# Patient Record
Sex: FEMALE | Race: WHITE
[De-identification: names, ages, dates, MRNs, and addresses within clinical notes are randomized per-mention and may not be internally consistent; named-entity substitution may affect disease eponyms.]

---

## 2017-10-26 ENCOUNTER — HOSPITAL ENCOUNTER (OUTPATIENT)
Dept: HOSPITAL 46 - OPS | Age: 62
Discharge: HOME | End: 2017-10-26
Attending: ORTHOPAEDIC SURGERY
Payer: COMMERCIAL

## 2017-10-26 VITALS — WEIGHT: 159.99 LBS | BODY MASS INDEX: 28.35 KG/M2 | HEIGHT: 63 IN

## 2017-10-26 DIAGNOSIS — M75.01: Primary | ICD-10-CM

## 2017-10-26 DIAGNOSIS — I10: ICD-10-CM

## 2017-10-26 DIAGNOSIS — Z88.2: ICD-10-CM

## 2017-10-26 DIAGNOSIS — Z88.8: ICD-10-CM

## 2017-10-26 PROCEDURE — 0RSJ0ZZ REPOSITION RIGHT SHOULDER JOINT, OPEN APPROACH: ICD-10-PCS | Performed by: ORTHOPAEDIC SURGERY

## 2017-10-26 NOTE — NUR
10/26/17 0719 Nga Farr
0716 - PT ARRIVED TO PACU. AIRWAY HELD. CRNA AWARE OF BP, STATES HIGH
VALUES ARE RELATED TO PT NOT HAVING BP MEDICATION THIS AM. "NO ACTION
NEEDED AT THIS TIME."

## 2023-02-24 ENCOUNTER — HOSPITAL ENCOUNTER (OUTPATIENT)
Dept: HOSPITAL 46 - DS | Age: 68
Discharge: HOME | End: 2023-02-24
Attending: SURGERY
Payer: MEDICARE

## 2023-02-24 VITALS — HEIGHT: 63 IN | WEIGHT: 162.04 LBS | BODY MASS INDEX: 28.71 KG/M2

## 2023-02-24 DIAGNOSIS — K59.09: ICD-10-CM

## 2023-02-24 DIAGNOSIS — K57.30: ICD-10-CM

## 2023-02-24 DIAGNOSIS — K63.5: ICD-10-CM

## 2023-02-24 DIAGNOSIS — D12.2: ICD-10-CM

## 2023-02-24 DIAGNOSIS — K20.90: ICD-10-CM

## 2023-02-24 DIAGNOSIS — K21.00: ICD-10-CM

## 2023-02-24 DIAGNOSIS — Z12.11: Primary | ICD-10-CM

## 2023-02-24 PROCEDURE — G0500 MOD SEDAT ENDO SERVICE >5YRS: HCPCS

## 2023-02-24 PROCEDURE — 0DBK8ZZ EXCISION OF ASCENDING COLON, VIA NATURAL OR ARTIFICIAL OPENING ENDOSCOPIC: ICD-10-PCS | Performed by: SURGERY

## 2023-02-24 PROCEDURE — 0DB68ZX EXCISION OF STOMACH, VIA NATURAL OR ARTIFICIAL OPENING ENDOSCOPIC, DIAGNOSTIC: ICD-10-PCS | Performed by: SURGERY

## 2023-02-24 PROCEDURE — 0DBN8ZZ EXCISION OF SIGMOID COLON, VIA NATURAL OR ARTIFICIAL OPENING ENDOSCOPIC: ICD-10-PCS | Performed by: SURGERY

## 2023-02-24 NOTE — NUR
02/24/23 0825 Aleida Osborne
0873 PATIENT ARRIVES TO PACU AWAKE BUT DROWSY. DENIES PAIN OR NAUSEA.
RESP EVEN AND UNLABORED, NC AT 3 LITERS, TURNED OFF. SLEEPING WHEN NOT
STIMULATED.

## 2023-02-27 NOTE — OR
[facility]
                                    [address], [place]  [address]
_________________________________________________________________________________________
                                                                 Signed   
 
 
DATE OF OPERATION:
02/24/2023
 
SURGEON:
Rosa Emanuel MD
 
PREOPERATIVE DIAGNOSES:
1. Gastroesophageal reflux, symptoms generally well controlled.
2. History of diverticulosis.  Last colonoscopy 2012.
 
POSTOPERATIVE DIAGNOSES:
1. Mild distal esophagitis without sign of hiatal hernia.
2. Sigmoid diverticulosis.
3. Polyps x2 (proximal ascending colon and sigmoid).
 
PROCEDURES:
1. Esophagogastroduodenoscopy with biopsy.
2. Total colonoscopy to the cecum with cold morcellation, excision of polyp of proximal
ascending colon and sigmoid. 
 
ANESTHESIA:
Intravenous sedation, fentanyl 150 mcg and Versed 9 mg (total).
 
INDICATION:
This 68-year-old white woman is a patient Dr. Enrique and referred for colonoscopy for
screening as well as upper endoscopy for symptoms of reflux.  She has been treated with
PPI medication persistently and doing well with it generally speaking.  Previous upper
endoscopy confirmed small hiatal hernia and distal esophagitis.  She additionally is
noted on colonoscopy in the past to have diverticulosis.  She is admitted to undergo
upper endoscopy and colonoscopy.  She understands the risk of bleeding, infection,
perforation, and other unforeseen complications. 
 
FINDINGS:
Upper endoscopy was relatively normal.  There is mild distal esophagitis, but no
evidence of Peralta epithelium.  The flap valve appeared good.  I did not see hiatal
hernia, though it was documented from the past. 
 
On colonoscopy, the prep was good and complete colonoscopy was undertaken to the cecum.
There were numerous diverticula of the sigmoid colon.  There were two polyps, one in the
proximal ascending colon, the other in the sigmoid, both were excised. 
 
DESCRIPTION OF PROCEDURE:
 
    Electronically Signed By: ROSA EMANUEL MD  02/27/23 0808
_________________________________________________________________________________________
PATIENT NAME:     ZHENG STANTON                   
MEDICAL RECORD #: [id number]            OPERATIVE REPORT              
          ACCT #: [id number]  
DATE OF BIRTH:   [birth year]            REPORT #: [id number]      
PHYSICIAN:        ROSA EMANUEL MD                 
PCP:              KAI ENRIQUE MD               
REPORT IS CONFIDENTIAL AND NOT TO BE RELEASED WITHOUT AUTHORIZATION
 
 
                                  [facility]
                                    [address], [place]  [address]
_________________________________________________________________________________________
                                                                 Signed   
 
 
The patient was brought to the endoscopy suite and given topical lidocaine,
hypopharyngeal anesthesia and placed in the lateral decubitus position.  She was given
intravenous sedation to the point of slurred speech and nystagmus with full
cardiopulmonary monitoring.  A bite block was placed.  An Olympus video upper endoscope
was passed in the hypopharynx.  The vocal cords were visualized as normal. Scope was
advanced to the esophagus, throughout its length it was normal though the distal portion
had mild area of hyperkeratosis; there was no evidence of Peralta epithelium or a true
stricture.  The scope was passed to the stomach, which was insufflated with air.  Rugal
folds appeared normal as did the antrum.  The pylorus was normal. Scope was passed
through into the duodenum, which was normal. Biopsies taken there to assess for celiac
disease.  The scope was withdrawn and biopsies then taken of the antrum and proximal
stomach for both KATELYN and pathologic testing.  Retroflexed view showed a surprisingly
good flap valve considering documentation in the past of small hiatal hernia.  The scope
was withdrawn to the distal esophagus and the hyperkeratotic area appeared benign, but
was biopsied nevertheless.  Scope was withdrawn and midesophageal biopsies obtained as
well.  The scope was then removed. 
 
Plans were made for colonoscopy.  Digital rectal examination was normal.  Olympus video
colonoscope was passed in the rectum and manipulated throughout the colon ultimately
intubating the cecum itself.  The ileocecal valve and appendiceal orifice were well
identified and normal.  The scope was withdrawn and in the proximal ascending colon
there was a small sessile polyp, almost certainly adenomatous.  It was excised with cold
morcellation technique.  Complete excision was noted.  The scope was withdrawn and
examination found to be normal until the left colon where diverticula were once again
noted.  In the sigmoid, there was a small polyp, possibly hyperplastic, but more likely
adenomatous.  It was excised with cold morcellation technique as well.  Further
withdrawal allowed for retroflexed view of the rectum, which was normal.  Scope was
removed and the patient was taken to the recovery room in good condition. 
 
CONCLUDING DIAGNOSES:
1. Clinical gastroesophageal reflux with mild hyperkeratosis of the distal esophagus
(biopsied). 
2. Diverticulosis of colon with two small polyps.
 
PLAN:
Recommend repeat colonoscopy in 5 years based on polyps.  Would recommend continued use
of Prilosec on a daily basis.  Safety of this approach is well documented ([place]
__________ 2021). 
 
 
 
            ________________________________________
 
    Electronically Signed By: ROSA EMANUEL MD  02/27/23 0808
_________________________________________________________________________________________
PATIENT NAME:     ZHENG STANTON                   
MEDICAL RECORD #: [id number]            OPERATIVE REPORT              
          ACCT #: [id number]  
DATE OF BIRTH:   [birth year]            REPORT #: [id number]      
PHYSICIAN:        ROSA EMANUEL MD                 
PCP:              KAI ENRIQUE MD               
REPORT IS CONFIDENTIAL AND NOT TO BE RELEASED WITHOUT AUTHORIZATION
 
 
                                  [address]  [address]
_________________________________________________________________________________________
                                                                 Signed   
 
 
            Rosa Emanuel MD 
 
 
JM/MODL
Job #:  [id number]
DD:  02/24/2023 08:26:41
DT:  02/24/2023 12:05:54
 
cc:            Kai Enrique MD
 
 
Copies:  KAI ENRIQUE MD
~
 
 
 
 
 
 
 
 
 
 
 
 
 
 
 
 
 
 
 
 
 
 
 
 
 
 
 
 
 
 
    Electronically Signed By: ROSA EMANUEL MD  02/27/23 0808
_________________________________________________________________________________________
PATIENT NAME:     ZHENG STANTON                   
MEDICAL RECORD #: [id number]            OPERATIVE REPORT              
          ACCT #: [id number]  
DATE OF BIRTH:   [birth year]            REPORT #: [id number]      
PHYSICIAN:        ROSA EMANUEL MD                 
PCP:              KAI ENRIQUE MD               
REPORT IS CONFIDENTIAL AND NOT TO BE RELEASED WITHOUT AUTHORIZATION

## 2023-09-07 VITALS — SYSTOLIC BLOOD PRESSURE: 125 MMHG | DIASTOLIC BLOOD PRESSURE: 74 MMHG

## 2023-09-12 ENCOUNTER — HOSPITAL ENCOUNTER (OUTPATIENT)
Dept: HOSPITAL 46 - DS | Age: 68
Discharge: HOME | End: 2023-09-12
Attending: OTOLARYNGOLOGY
Payer: MEDICARE

## 2023-09-12 VITALS — SYSTOLIC BLOOD PRESSURE: 124 MMHG | DIASTOLIC BLOOD PRESSURE: 48 MMHG

## 2023-09-12 VITALS — SYSTOLIC BLOOD PRESSURE: 116 MMHG | DIASTOLIC BLOOD PRESSURE: 46 MMHG

## 2023-09-12 VITALS — DIASTOLIC BLOOD PRESSURE: 46 MMHG | SYSTOLIC BLOOD PRESSURE: 91 MMHG

## 2023-09-12 VITALS — HEIGHT: 63 IN | WEIGHT: 165.35 LBS | BODY MASS INDEX: 29.3 KG/M2

## 2023-09-12 DIAGNOSIS — E78.00: ICD-10-CM

## 2023-09-12 DIAGNOSIS — J32.0: Primary | ICD-10-CM

## 2023-09-12 PROCEDURE — A9270 NON-COVERED ITEM OR SERVICE: HCPCS

## 2023-09-12 PROCEDURE — 09JY0ZZ INSPECTION OF SINUS, OPEN APPROACH: ICD-10-PCS | Performed by: OTOLARYNGOLOGY

## 2023-09-12 NOTE — NUR
PT RESTING IN BED WITH EYES CLOSED ON ENTRANCE, WAKES WITH VERBAL STIMULATION.
PT TOLERATES PO WITH NO NAUSEA. DENIES URGE TO VOID AT THIS TIME. CALL LIGHT
WITHIN REACH.

## 2023-09-12 NOTE — NUR
09/12/23 Gumaro5 Gerri Rogers
1015- PT ARRIVES TO UNIT VIA STRETCHER FROM OR. PT RESPONSIVE TO
TACTILE STIMULI AT THIS TIME. PT REPORTS NO PAIN OR NAUSEA AT THIS
TIME. PT ON RA W/O2 >90% VIA PULSE OX AT THIS TIME. PT RESPIRATIONS
ARE EVEN AND UNLABORED, NO SIGNS OF DISTRESS. JUSTIN TRISHA AT BEDSIDE
FOR REPORT.
 
1019- DR. DEVLIN AT BEDSIDE AT THIS TIME DISCUSSING PROCEDURE WITH
PT.

## 2023-09-12 NOTE — OR
[facility]
                                    [address], [place]  [address]
_________________________________________________________________________________________
                                                                 Signed   
 
 
DATE OF OPERATION:
09/12/2023
 
SURGEON:
Alexander Devlin MD
 
PREOPERATIVE DIAGNOSIS:
Chronic left maxillary sinusitis.
 
POSTOPERATIVE DIAGNOSIS:
Chronic left maxillary sinusitis.
 
PROCEDURE:
Left maxillary sinusotomy.
 
ANESTHESIA:
General LMA, CRNA, Tom.
 
PREOPERATIVE HISTORY:
Ms. Green is a 68-year-old lady with a long history of sinus problems.  She has had
multiple operations in the past.  Most recently, left facial pain, unresponsive to
appropriate medications including multiple antibiotics.  The CAT scan has shown
opacification of the left maxillary sinus with closure of the left nasal antral window.
All other sinuses were clear on the CT scan.  She was taken to the operating room for
the above-mentioned procedures. 
 
OPERATIVE PROCEDURE AND FINDINGS:
After informed consent, the patient was taken to the operating room, placed in the
supine position where general LMA anesthesia was induced. The patient and procedure were
verified.  The patient received preoperative intranasal oxymetazoline and intravenous
Ancef.  Preop CT was reviewed throughout. The nasal cavity was inspected with the
headlight and speculum.  The left nasal antral window was made with a curved ring
curette, widened __________ and purulent material obtained.  The nasal antral window was
widened with the Michelle.  All material removed from the maxillary sinus with curette
and suctioning.  Minimal bleeding stopped afterwards. Pharynx was suctioned clear of
blood and secretions.  No packing was placed.  The patient was awakened, extubated,
transported to recovery in good condition.  No complications. 
 
BLOOD LOSS:
Minimal.
 
SPECIMEN:
 
    Electronically Signed By: ALEXANDER DEVLIN MD  09/12/23 1419
_________________________________________________________________________________________
PATIENT NAME:     ZHENG GREEN                   
MEDICAL RECORD #: [id number]            OPERATIVE REPORT              
          ACCT #: [id number]  
DATE OF BIRTH:   [birth year]            REPORT #: [id number]      
PHYSICIAN:        ALEXANDER DEVLIN MD              
PCP:              JOHN PEREZ MD                 
REPORT IS CONFIDENTIAL AND NOT TO BE RELEASED WITHOUT AUTHORIZATION
 
 
                                  [address]
                                  [place], [place]  [address]
_________________________________________________________________________________________
                                                                 Signed   
 
 
Left maxillary sinus contents to pathology.
 
DRAINS:
No drains.
 
PACKING:
No packing.
 
 
 
            ________________________________________
            Alexander Devlin MD GC/RADHA
Job #:  [id number]
DD:  09/12/2023 10:18:38
DT:  09/12/2023 12:00:54
 
 
Copies:                                
~
 
 
 
 
 
 
 
 
 
 
 
 
 
 
 
 
 
 
 
 
 
    Electronically Signed By: ALEXANDER DEVLIN MD  09/12/23 1419
_________________________________________________________________________________________
PATIENT NAME:     ZHENG GREEN                   
MEDICAL RECORD #: [id number]            OPERATIVE REPORT              
          ACCT #: [id number]  
DATE OF BIRTH:   [birth year]            REPORT #: [id number]      
PHYSICIAN:        ALEXANDER DEVLIN MD              
PCP:              JOHN PEREZ MD                 
REPORT IS CONFIDENTIAL AND NOT TO BE RELEASED WITHOUT AUTHORIZATION

## 2023-09-12 NOTE — NUR
[id number]: PT SITS AT SIDE OF BED PRIOR TO STANDING, DENIES DIZZINESS OR NAUSEA
WITH POSITION CHANGE. PT DRESSES SELF AND ENCOURAGED TO OPEN CURTAIN WHEN
DONE. THIS RN PHONES PT SPOUSE FOR SAFE RIDE HOME. DC INSTRUCTIONS PRESENTED
VERBALLY AND WRITTEN AND PT DS FROM DS RM 3 VIA WC TO SPOUSE IN PERSONAL
VEHICLE TO HOME.

## 2023-09-12 NOTE — NUR
1045: PT BACK TO DS RM 3 FROM PACU VIA STRETCHER, A&O X3. PT DENIES PAIN AND
RATES 0/10 AND TOLERATES SIPS OF WATER. DC CRITERIA EXPLAINED, CALL LIGHT
WITHIN REACH. WARM BLANKET AND JELLO PROVIDED.

## 2023-09-13 NOTE — EKG
[facility]
                                    [address] [facility]
                                  [place], [place]  [address]
_________________________________________________________________________________________
                                                                 Signed   
 
 
Normal sinus rhythm
Normal ECG
When compared with ECG of 23-OCT-2017 16:03,
No significant change was found
Confirmed by ROCCO WOO MD ([id number]) on 9/13/2023 5:56:26 AM
 
 
 
 
 
 
 
 
 
 
 
 
 
 
 
 
 
 
 
 
 
 
 
 
 
 
 
 
 
 
 
 
 
 
 
 
 
 
 
 
    Electronically Signed By: ROCCO WOO  09/13/23 0556
_________________________________________________________________________________________
PATIENT NAME:     ZHENG STANTON                   
MEDICAL RECORD #: [id number]                     Electrocardiogram             
          ACCT #: [id number]  
DATE OF BIRTH:   [birth year]                                       
PHYSICIAN:   ROCCO WOO                       REPORT #: [id number]
REPORT IS CONFIDENTIAL AND NOT TO BE RELEASED WITHOUT AUTHORIZATION

## 2023-09-18 NOTE — PATH
[facility]
                                    [address] [facility]
                                  [place], [place]  [address]
_________________________________________________________________________________________
                                                                 Signed   
 
 
 
SPECIMEN(S): A LEFT MAXILLARY SINUS CONTENTS
 
SPECIMEN SOURCE:
A. LEFT MAXILLARY SINUS CONTENTS
 
CLINICAL HISTORY:
Chronic sinusitis.
 
FINAL PATHOLOGIC DIAGNOSIS:
Left maxillary sinus contents:
-  Respiratory mucosa with moderate chronic inflammation
-  Lymphoplasmycte to eosinophil ratio is approximately 3: 1
-  Moderate neutrophilic inflammation is present
BRP
 
MICROSCOPIC EXAMINATION:
Histologic sections of all submitted blocks are examined by light microscopy.  
These findings, together with the gross examination, support the pathologic 
diagnosis. 
BRP
 
GROSS DESCRIPTION:
The specimen, labeled and designated "TESSY Green" and designated on the 
requisition "left maxillary sinus contents," is received in formalin and 
consists of one fragment of tan-pink soft tissue (0.5 x 
0.3 x 0.2 cm).  The specimen is submitted entirely in cassette A1.
AC  (under the direct supervision of a pathologist)
The Gross Description was prepared using a voice recognition system. The report 
was reviewed for accuracy; however, sound-alike word errors, addition and/or 
deletions may occur. If there is any 
question about this report, please contact Client Services.
 
ADDITIONAL NOTES:
Immunohistochemical and/or in situ hybridization studies if performed in this 
case included appropriate positive controls that reacted as expected.  This 
test was developed and its performance 
characteristics determined by [facility].  It has not been cleared or 
approved by the U.S. Food and Drug Administration.  The FDA has determined that 
such clearance or approval is not 
necessary.  This test is used for clinical purposes.  It should not be regarded 
as investigational or for research.  [facility] is certified under the 
 
                                                                                    
_________________________________________________________________________________________
PATIENT NAME:     ZHENG GREEN                   
MEDICAL RECORD #: [id number]            PATHOLOGY                     
          ACCT #: [id number]       ACCESSION #: [id number]     
DATE OF BIRTH:   [birth year]            REPORT #: [id number]       
PHYSICIAN:        YG DELONG              
PCP:              JOHN PEREZ MD                 
REPORT IS CONFIDENTIAL AND NOT TO BE RELEASED WITHOUT AUTHORIZATION
 
 
                                  [facility]
                                    [address] [facility]
                                  [place]  [address]
_________________________________________________________________________________________
                                                                 Signed   
 
 
Clinical Laboratory Improvement 
Amendments of 1988 (CLIA) as qualified to perform high complexity clinical 
laboratory testing. 
 
PERFORMING LABORATORY:
Technical component was performed by [facility], [address] [place] [id number] (CLIA# [id number]). Professional interpretation was 
performed by [facility] [address] [place] [phone] [id number]
 
Diagnostician:  Carlos Franklin MD
Pathologist
Electronically Signed 09/18/2023
 
 
Copies:                                
~
 
 
 
 
 
 
 
 
 
 
 
 
 
 
 
 
 
 
 
 
 
 
 
 
 
 
                                                                                    
_________________________________________________________________________________________
PATIENT NAME:     ZHENG GREEN                   
MEDICAL RECORD #: [id number]            PATHOLOGY                     
          ACCT #: [id number]       ACCESSION #: [id number]     
DATE OF BIRTH:   [birth year]            REPORT #: [id number]       
PHYSICIAN:        YG DELONG              
PCP:              JOHN PEREZ MD                 
REPORT IS CONFIDENTIAL AND NOT TO BE RELEASED WITHOUT AUTHORIZATION